# Patient Record
Sex: MALE | Race: WHITE
[De-identification: names, ages, dates, MRNs, and addresses within clinical notes are randomized per-mention and may not be internally consistent; named-entity substitution may affect disease eponyms.]

---

## 2018-05-26 ENCOUNTER — HOSPITAL ENCOUNTER (INPATIENT)
Dept: HOSPITAL 26 - MED | Age: 58
LOS: 2 days | Discharge: ANOTHER HOSPITAL | DRG: 190 | End: 2018-05-28
Attending: FAMILY MEDICINE | Admitting: FAMILY MEDICINE
Payer: COMMERCIAL

## 2018-05-26 VITALS — DIASTOLIC BLOOD PRESSURE: 87 MMHG | SYSTOLIC BLOOD PRESSURE: 140 MMHG

## 2018-05-26 VITALS — SYSTOLIC BLOOD PRESSURE: 130 MMHG | DIASTOLIC BLOOD PRESSURE: 71 MMHG

## 2018-05-26 VITALS — WEIGHT: 250 LBS | HEIGHT: 67 IN | BODY MASS INDEX: 39.24 KG/M2

## 2018-05-26 DIAGNOSIS — I10: ICD-10-CM

## 2018-05-26 DIAGNOSIS — E78.5: ICD-10-CM

## 2018-05-26 DIAGNOSIS — I25.119: ICD-10-CM

## 2018-05-26 DIAGNOSIS — E11.9: ICD-10-CM

## 2018-05-26 DIAGNOSIS — E78.00: ICD-10-CM

## 2018-05-26 DIAGNOSIS — Z88.1: ICD-10-CM

## 2018-05-26 DIAGNOSIS — I21.4: Primary | ICD-10-CM

## 2018-05-26 LAB
ALBUMIN FLD-MCNC: 3.7 G/DL (ref 3.4–5)
ANION GAP SERPL CALCULATED.3IONS-SCNC: 11.7 MMOL/L (ref 8–16)
AST SERPL-CCNC: 20 U/L (ref 15–37)
BASOPHILS # BLD AUTO: 0.1 K/UL (ref 0–0.22)
BASOPHILS NFR BLD AUTO: 0.6 % (ref 0–2)
BILIRUB SERPL-MCNC: 0.4 MG/DL (ref 0–1)
BUN SERPL-MCNC: 17 MG/DL (ref 7–18)
CHLORIDE SERPL-SCNC: 104 MMOL/L (ref 98–107)
CK MB SERPL-MCNC: 3.8 NG/ML (ref 0–3.6)
CO2 SERPL-SCNC: 25.6 MMOL/L (ref 21–32)
CREAT SERPL-MCNC: 0.9 MG/DL (ref 0.7–1.3)
EOSINOPHIL # BLD AUTO: 0.1 K/UL (ref 0–0.4)
EOSINOPHIL NFR BLD AUTO: 0.6 % (ref 0–4)
ERYTHROCYTE [DISTWIDTH] IN BLOOD BY AUTOMATED COUNT: 14 % (ref 11.6–13.7)
GFR SERPL CREATININE-BSD FRML MDRD: 111 ML/MIN (ref 90–?)
GLUCOSE SERPL-MCNC: 170 MG/DL (ref 74–106)
HCT VFR BLD AUTO: 41.6 % (ref 36–52)
HGB BLD-MCNC: 14 G/DL (ref 12–18)
LYMPHOCYTES # BLD AUTO: 3.9 K/UL (ref 2–11.5)
LYMPHOCYTES NFR BLD AUTO: 42.2 % (ref 20.5–51.1)
MCH RBC QN AUTO: 30 PG (ref 27–31)
MCHC RBC AUTO-ENTMCNC: 34 G/DL (ref 33–37)
MCV RBC AUTO: 89 FL (ref 80–94)
MONOCYTES # BLD AUTO: 0.7 K/UL (ref 0.8–1)
MONOCYTES NFR BLD AUTO: 7.2 % (ref 1.7–9.3)
NEUTROPHILS # BLD AUTO: 4.5 K/UL (ref 1.8–7.7)
NEUTROPHILS NFR BLD AUTO: 49.4 % (ref 42.2–75.2)
PLATELET # BLD AUTO: 265 K/UL (ref 140–450)
POTASSIUM SERPL-SCNC: 4.3 MMOL/L (ref 3.5–5.1)
RBC # BLD AUTO: 4.67 MIL/UL (ref 4.2–6.1)
SODIUM SERPL-SCNC: 137 MMOL/L (ref 136–145)
WBC # BLD AUTO: 9.2 K/UL (ref 4.8–10.8)

## 2018-05-26 RX ADMIN — Medication SCH MG: at 20:51

## 2018-05-26 RX ADMIN — Medication SCH DEV: at 20:35

## 2018-05-26 NOTE — NUR
RECEIVED PT VIA AMADORRENE FROM ER NURSE, PT IS AWAKE AND WITH A LEFT HAND G.20 SALINE LOCK IN 
PLACE, AND HAS A LEFT UPPER CHEST NITRO BID IN PLACE, WIFE IS AT THE BEDSIDE. SIDE RAILS ARE 
UP AND CALL LIGHT WITHIN REACH. NO SIGN OF DISTRESS NOTED AND WILL CONTINUE TO MONITOR.

## 2018-05-26 NOTE — NUR
ENDORSED PT TO NIGHT SHIFT NURSEJOÃO FOR ADMISSION ROUTINES AND FOR CONTINUITY OF CARE. PT 
IS STABLE AT THIS TIME.

## 2018-05-26 NOTE — NUR
DR. MOYA IN TO SEE PT AND DISCUSSED PLAN OF CARE. PT DUE TO HAVE ECHO AND EKG TOMORROW 
5/27/18. PT VERBALIZED UNDERSTANDING.

## 2018-05-26 NOTE — NUR
PT SITTING IN CHAIR NEXT TO BED WATCHING TV ON CELLPHONE. NO S/S OF RESPIRATORY DISTRESS OR 
DISCOMFORT NOTED. WILL CONTINUE TO MONITOR.

## 2018-05-26 NOTE — NUR
INTERMITTENT NON RADIATING CHEST PAIN AND PALPITATIONS  X 4 DAYS, STATES HE'S 
TAKING LIQUID TUMERIC AND HAS NOTICED LEFT ANTERIOR CP SINCE THEN. DENIES 
FEVERS.CHILLS.

 . DENIES N/V/D; SKIN IS PINK/WARM/DRY; AAOX4 WITH EVEN AND STEADY GAIT; LUNGS 
CLEAR BL; HR EVEN AND REGULAR; PT DENIES ANY FEVER, SOB, OR COUGH AT THIS TIME; 
PATIENT STATES PAIN OF 4/10 AT THIS TIME; VSS; PATIENT POSITIONED FOR COMFORT; 
HOB ELEVATED; BEDRAILS UP X2; BED DOWN. ER MD MADE AWARE OF PT STATUS.

## 2018-05-26 NOTE — NUR
TRANFERRED PT TO TELE 122A BY SHAKIR, REPORT GIVEN TO OH FENG. PT AWAKE, 
ALERT,  NO S/S OF RESPIRATORY DISTRESS NOTED. VITALS STABLE AT THIS MOMENT.ABLE 
TO WALK.

## 2018-05-26 NOTE — NUR
RECEIVED REPORT FROM DAY SHIFT NURSE TEREZA-RN. AOX4-Luxembourgish SPEAKING WITH WIFE AT BEDSIDE. 
, ON ROOM AIR WITH IV LEFT HAND #20G SALINE LOCK IN PLACE, AND HAS A LEFT UPPER CHEST NITRO 
BID IN PLACE. SKIN INTACT WITH SOME BRUISING ON KNEES AND ARMS DUE TO WORK RELATED INCIDENT. 
NO S/S OF RESPIRATORY DISTRESS OR DISCOMFORT NOTED AT THIS TIME. DISCUSSED PLAN OF CARE AND 
PT VERBALIZED UNDERSTANDING. WHITE BOARD UPDATED. BED IN LOWEST POSITION, SIDE RAILS ARE UP, 
BED BREAKS LOCKED. BED SIDE TABLE AND CALL LIGHT WITHIN REACH. WILL CONTINUE TO MONITOR.

## 2018-05-26 NOTE — NUR
PT IS AWAKE WITH WIFE ON THE BEDSIDE, VITAL SIGNS TAKEN AND NO SIGN OF DISTRESS NOTED. CALL 
LIGHT WITHIN REACH AND WILL CONTINUE TO MONITOR.

## 2018-05-27 VITALS — SYSTOLIC BLOOD PRESSURE: 123 MMHG | DIASTOLIC BLOOD PRESSURE: 74 MMHG

## 2018-05-27 VITALS — SYSTOLIC BLOOD PRESSURE: 132 MMHG | DIASTOLIC BLOOD PRESSURE: 88 MMHG

## 2018-05-27 VITALS — SYSTOLIC BLOOD PRESSURE: 113 MMHG | DIASTOLIC BLOOD PRESSURE: 69 MMHG

## 2018-05-27 VITALS — SYSTOLIC BLOOD PRESSURE: 126 MMHG | DIASTOLIC BLOOD PRESSURE: 78 MMHG

## 2018-05-27 VITALS — SYSTOLIC BLOOD PRESSURE: 149 MMHG | DIASTOLIC BLOOD PRESSURE: 95 MMHG

## 2018-05-27 VITALS — DIASTOLIC BLOOD PRESSURE: 78 MMHG | SYSTOLIC BLOOD PRESSURE: 127 MMHG

## 2018-05-27 LAB
ALBUMIN FLD-MCNC: 3.5 G/DL (ref 3.4–5)
ANION GAP SERPL CALCULATED.3IONS-SCNC: 11.4 MMOL/L (ref 8–16)
AST SERPL-CCNC: 21 U/L (ref 15–37)
BASOPHILS # BLD AUTO: 0 K/UL (ref 0–0.22)
BASOPHILS NFR BLD AUTO: 0.3 % (ref 0–2)
BILIRUB SERPL-MCNC: 0.5 MG/DL (ref 0–1)
BUN SERPL-MCNC: 17 MG/DL (ref 7–18)
CHLORIDE SERPL-SCNC: 103 MMOL/L (ref 98–107)
CK MB SERPL-MCNC: 4.2 NG/ML (ref 0–3.6)
CO2 SERPL-SCNC: 26.5 MMOL/L (ref 21–32)
CREAT SERPL-MCNC: 0.9 MG/DL (ref 0.7–1.3)
EOSINOPHIL # BLD AUTO: 0.1 K/UL (ref 0–0.4)
EOSINOPHIL NFR BLD AUTO: 0.7 % (ref 0–4)
ERYTHROCYTE [DISTWIDTH] IN BLOOD BY AUTOMATED COUNT: 13.8 % (ref 11.6–13.7)
GFR SERPL CREATININE-BSD FRML MDRD: 111 ML/MIN (ref 90–?)
GLUCOSE SERPL-MCNC: 105 MG/DL (ref 74–106)
HCT VFR BLD AUTO: 39.4 % (ref 36–52)
HGB BLD-MCNC: 13.2 G/DL (ref 12–18)
LYMPHOCYTES # BLD AUTO: 4.5 K/UL (ref 2–11.5)
LYMPHOCYTES NFR BLD AUTO: 43.5 % (ref 20.5–51.1)
MAGNESIUM SERPL-MCNC: 1.7 MG/DL (ref 1.8–2.4)
MCH RBC QN AUTO: 30 PG (ref 27–31)
MCHC RBC AUTO-ENTMCNC: 34 G/DL (ref 33–37)
MCV RBC AUTO: 88.3 FL (ref 80–94)
MONOCYTES # BLD AUTO: 0.7 K/UL (ref 0.8–1)
MONOCYTES NFR BLD AUTO: 6.4 % (ref 1.7–9.3)
NEUTROPHILS # BLD AUTO: 5 K/UL (ref 1.8–7.7)
NEUTROPHILS NFR BLD AUTO: 49.1 % (ref 42.2–75.2)
PHOSPHATE SERPL-MCNC: 3.5 MG/DL (ref 2.5–4.9)
PLATELET # BLD AUTO: 262 K/UL (ref 140–450)
POTASSIUM SERPL-SCNC: 3.9 MMOL/L (ref 3.5–5.1)
RBC # BLD AUTO: 4.46 MIL/UL (ref 4.2–6.1)
SODIUM SERPL-SCNC: 137 MMOL/L (ref 136–145)
WBC # BLD AUTO: 10.2 K/UL (ref 4.8–10.8)

## 2018-05-27 RX ADMIN — HEPARIN SODIUM SCH MLS/HR: 5000 INJECTION, SOLUTION INTRAVENOUS at 02:37

## 2018-05-27 RX ADMIN — Medication SCH DEV: at 20:38

## 2018-05-27 RX ADMIN — SODIUM CHLORIDE SCH MLS/HR: 9 INJECTION, SOLUTION INTRAVENOUS at 15:00

## 2018-05-27 RX ADMIN — Medication SCH DEV: at 16:12

## 2018-05-27 RX ADMIN — HEPARIN SODIUM SCH MLS/HR: 5000 INJECTION, SOLUTION INTRAVENOUS at 09:48

## 2018-05-27 RX ADMIN — Medication SCH DEV: at 05:43

## 2018-05-27 RX ADMIN — Medication SCH DEV: at 12:15

## 2018-05-27 RX ADMIN — Medication SCH MG: at 20:39

## 2018-05-27 RX ADMIN — Medication SCH MG: at 08:57

## 2018-05-27 RX ADMIN — HEPARIN SODIUM SCH MLS/HR: 5000 INJECTION, SOLUTION INTRAVENOUS at 16:09

## 2018-05-27 NOTE — NUR
VITAL SIGNS TAKEN AND TOLERATED WELL. SCD'S IN PLACE. PT RESTING IN BED. NO S/S OF 
RESPIRATORY DISTRESS OR DISCOMFORT NOTED AT THIS TIME. WILL CONTINUE TO MONITOR.

## 2018-05-27 NOTE — NUR
DR NARCISO MOYA CALLED WITH TELEPHONE ORDER, SOCIAL SERVICE REQUEST FOR TRANSFER TO [facility] FOR CARDIAC CATH SCHEDULED AT 11:30 5/28/2018.  DR NARCISO HARO ACCEPTING PHYSICIAN AT 
[place].

## 2018-05-27 NOTE — NUR
PTT RESULTED 37.5, PER PROTOCOL, HEPARIN BOLUS DOES 2500 UNIT GIVEN, CONTINUOUS INFUSION 
INCREASED TO 1200 UNIT/HR.  PT HAS NO S/S OF BLEEDING, NO ACUTE BRUISING, DENIES CHEST PAIN 
OR SHORT OF BREATH, TALKING WITH FAMILY IN NAD, WILL CONTINUE TO [place].

## 2018-05-27 NOTE — NUR
NOTIFIED LÁZARO  REGARDING CARDIAC CATH SCHEDULED BY DR. MOYA TOMORROW. CALLED 
[facility] TO CONFIRM AND SPOKE TO SUZY MALCOLM SUPERVISOR. SHE SAID SHE WILL CALL ME BACK TO 
VERIFY.

## 2018-05-27 NOTE — NUR
SHIFT ASSESSMENT COMPLETE. EYES 3MM TO 2MM BRISK PERRL. AAOX3. HEART SOUNDS S1 S2 NORMAL. 
LUNGS CLEAR BILATERALLY. BOWEL SOUNDS ACTIVE X4 QUADRANTS. SKIN WARM, DRY, AND INTACT. CAP 
REFILL < 3S. VS STABLE PT NOT IN ANY ACUTE DISTRESS. BED LOCKED IN LOW POSITION. CALL BELL 
WITHIN REACH. WILL CONTINUE TO MONITOR.

## 2018-05-27 NOTE — NUR
PT AWAKE RESTING IN BED. NO S/S OF RESPIRATORY DISTRESS OR DISCOMFORT AT THIS TIME. BLOOD 
GLUCOSE 101. NO INSULIN NEEDED. WILL CONTINUE TO MONITOR.

## 2018-05-27 NOTE — NUR
REPORT RECEIVED FROM NIGHT SHIFT NURSE JOÃO, PT AAOX4, RESP EVEN UNLABORED, SKIN WARM DRY, 
COLOR WNL, PT DENIES PAIN OR SOB, DENIES ANY NEEDS, PLAN OF CARE REVIEWED, BED LOCKED IN LOW 
POSITION, SIDE RAILS UP, CALL BELL WITHIN REACH, WILL CONTINUE TO MONITOR.

-------------------------------------------------------------------------------

Addendum: 05/27/18 at 0741 by Kirsten Cerrato RN

-------------------------------------------------------------------------------

PT ON HEPARIN DRIP AT 1000 UNITS/HR, NEXT PTT DRAW AT 0800.  NO ACTIVE BLEEDING OR BRUISING 
NOTED.

## 2018-05-27 NOTE — NUR
DR NARCISO MOYA CALLED ON THE PHONE, PT CONDITION REPORTED, NO CHANGES IN ORDER, DR NARCISO MOYA 
TO COME SEE PT LATER.

## 2018-05-27 NOTE — NUR
DR. HARO'S ONCALL IS DR. CHAD SHIELDS AND WAS GIVEN ORDERS TO START HEPARIN DRIP. 
WILL ALSO CALL DANE CHEN IN REGARDS TO THE TROPONIN RESULTS.

## 2018-05-27 NOTE — NUR
PT RESTING AT THIS TIME. VITAL SIGNS TAKEN AND TOLERATED WELL. NO S/S OF RESPIRATORY 
DISTRESS OR DISCOMFORT NOTED AT THIS TIME. WILL CONTINUE TO MONITOR.

## 2018-05-27 NOTE — NUR
DR LÓPEZ AT BEDSIDE, PLAN OF CARE EXPLAINED TO PT AND SON, WIFE, THEY VERBALIZED 
UNDERSTANDING AND AGREES TO TRANSFER TO [place] FOR CARDIAC CATH TOMORROW.  PT DENIES CHEST 
PAIN, DENIES SOB, HEPARIN DRIP CONTINUES AT 1200 UNITS/HR, NO S/S OF BLEEDING NOTED.

## 2018-05-27 NOTE — NUR
BEDSIDE GLUCOSE 200, PT REFUSES INSULIN, PT STATES HE HAS "BEEN ON PILLS FOR DIABETES FOR 10 
YEARS NEVER NEEDED INSULIN" PT DOES NOT WANT TO START INSULIN NOW, WILL NOTIFY MD

## 2018-05-27 NOTE — NUR
HEPARIN DRIP STARTED, BOLOS GIVEN AND WILL ORDER PTT FOR 0830 AS PROTOCOL. WILL CONTINUE TO 
MONITOR.

## 2018-05-27 NOTE — NUR
PTT RESULTED, 47.4, NO CHANGE IN HEPARIN RATE PER PROTOCOL.

-------------------------------------------------------------------------------

Addendum: 05/27/18 at 1126 by Kirsten Cerrato RN

-------------------------------------------------------------------------------

HEPARIN CONTINUES AT 1000 UNITS / HR.

## 2018-05-27 NOTE — NUR
YUN FROM [facility] CALLED AND SHE SAID TO TRANSFER PATIENT [facility] BETWEEN 7-8 AM. FAXED FACESHEET 
[phone].

SHE SAID TO CALL HOUSE SUPERVISOR [phone] AROUND 5AM FOR A BED.

## 2018-05-28 VITALS — DIASTOLIC BLOOD PRESSURE: 88 MMHG | SYSTOLIC BLOOD PRESSURE: 150 MMHG

## 2018-05-28 VITALS — DIASTOLIC BLOOD PRESSURE: 86 MMHG | SYSTOLIC BLOOD PRESSURE: 140 MMHG

## 2018-05-28 VITALS — SYSTOLIC BLOOD PRESSURE: 140 MMHG | DIASTOLIC BLOOD PRESSURE: 86 MMHG

## 2018-05-28 LAB
ALBUMIN FLD-MCNC: 3.5 G/DL (ref 3.4–5)
ANION GAP SERPL CALCULATED.3IONS-SCNC: 10.4 MMOL/L (ref 8–16)
AST SERPL-CCNC: 19 U/L (ref 15–37)
BASOPHILS # BLD AUTO: 0.1 K/UL (ref 0–0.22)
BASOPHILS NFR BLD AUTO: 0.8 % (ref 0–2)
BILIRUB SERPL-MCNC: 0.4 MG/DL (ref 0–1)
BUN SERPL-MCNC: 16 MG/DL (ref 7–18)
CHLORIDE SERPL-SCNC: 101 MMOL/L (ref 98–107)
CO2 SERPL-SCNC: 28.4 MMOL/L (ref 21–32)
CREAT SERPL-MCNC: 0.9 MG/DL (ref 0.7–1.3)
EOSINOPHIL # BLD AUTO: 0.1 K/UL (ref 0–0.4)
EOSINOPHIL NFR BLD AUTO: 1 % (ref 0–4)
ERYTHROCYTE [DISTWIDTH] IN BLOOD BY AUTOMATED COUNT: 13.8 % (ref 11.6–13.7)
GFR SERPL CREATININE-BSD FRML MDRD: 111 ML/MIN (ref 90–?)
GLUCOSE SERPL-MCNC: 148 MG/DL (ref 74–106)
HCT VFR BLD AUTO: 39.9 % (ref 36–52)
HGB BLD-MCNC: 13.4 G/DL (ref 12–18)
LYMPHOCYTES # BLD AUTO: 5.4 K/UL (ref 2–11.5)
LYMPHOCYTES NFR BLD AUTO: 56 % (ref 20.5–51.1)
MCH RBC QN AUTO: 30 PG (ref 27–31)
MCHC RBC AUTO-ENTMCNC: 33 G/DL (ref 33–37)
MCV RBC AUTO: 88.8 FL (ref 80–94)
MONOCYTES # BLD AUTO: 0.7 K/UL (ref 0.8–1)
MONOCYTES NFR BLD AUTO: 7.8 % (ref 1.7–9.3)
NEUTROPHILS # BLD AUTO: 3.3 K/UL (ref 1.8–7.7)
NEUTROPHILS NFR BLD AUTO: 34.4 % (ref 42.2–75.2)
PLATELET # BLD AUTO: 230 K/UL (ref 140–450)
POTASSIUM SERPL-SCNC: 3.8 MMOL/L (ref 3.5–5.1)
RBC # BLD AUTO: 4.5 MIL/UL (ref 4.2–6.1)
SODIUM SERPL-SCNC: 136 MMOL/L (ref 136–145)
WBC # BLD AUTO: 9.6 K/UL (ref 4.8–10.8)

## 2018-05-28 RX ADMIN — Medication SCH DEV: at 06:06

## 2018-05-28 RX ADMIN — SODIUM CHLORIDE SCH MLS/HR: 9 INJECTION, SOLUTION INTRAVENOUS at 02:25

## 2018-05-28 RX ADMIN — HEPARIN SODIUM SCH MLS/HR: 5000 INJECTION, SOLUTION INTRAVENOUS at 00:18

## 2018-05-28 NOTE — NUR
REPORT GIVEN TO YAIMA FENG AT [facility] @ [phone]. PT WILL GO TO BED 
341B. PICKUP SCHEDULED FOR 0700.

## 2018-05-28 NOTE — NUR
HEPARIN DRIP DC AT 0645. PT PREPPED FOR TRANSFER TO [facility] [place].

## 2018-05-29 NOTE — NUR
RETRO   ER REPORT, H&P, CONSULT AND PROGRESS NOTE FAXED TO [facility] [place]   [phone]   PHONE KAVYA 
[phone]     NO DISCHARGE SUMMARY.